# Patient Record
Sex: FEMALE | Race: WHITE | URBAN - METROPOLITAN AREA
[De-identification: names, ages, dates, MRNs, and addresses within clinical notes are randomized per-mention and may not be internally consistent; named-entity substitution may affect disease eponyms.]

---

## 2017-05-19 ENCOUNTER — OFFICE (OUTPATIENT)
Dept: URBAN - METROPOLITAN AREA CLINIC 33 | Facility: CLINIC | Age: 37
End: 2017-05-19

## 2017-05-19 VITALS
DIASTOLIC BLOOD PRESSURE: 62 MMHG | SYSTOLIC BLOOD PRESSURE: 100 MMHG | TEMPERATURE: 97.5 F | HEART RATE: 88 BPM | WEIGHT: 123 LBS | HEIGHT: 63 IN

## 2017-05-19 DIAGNOSIS — R10.13 EPIGASTRIC PAIN: ICD-10-CM

## 2017-05-19 PROCEDURE — 99214 OFFICE O/P EST MOD 30 MIN: CPT

## 2017-05-19 NOTE — SERVICEHPINOTES
38 yo white female LATESHA presents for f/u upper abdominal pain. Was seen here a year ago for upper abdominal pain episodes at which time were in a band across her upper abdomen and happening over the last couple of years. Since seen in May of 2016 she's had 3 attacks - August (went to ER), November (had to miss work), and then again in February. Her GB U/S was normal. In the past week she's had low-grade discomfort in epigastrium. Otherwise she denies pain between her other episodes. Today she is realizing her episodes may be correlated to her menstrual period every 3 months (on Seasonique). Denies any discomfort when she eats. No nausea or vomiting, no black stools. Rare NSAID use. Currently she is on Nexium 20 mg - has been on this since her episode in August. Has had mild heartburn/indigestion issues for years but this is not a current concern. Has regular bowel habits. BR